# Patient Record
Sex: FEMALE | Race: WHITE
[De-identification: names, ages, dates, MRNs, and addresses within clinical notes are randomized per-mention and may not be internally consistent; named-entity substitution may affect disease eponyms.]

---

## 2018-05-10 ENCOUNTER — HOSPITAL ENCOUNTER (EMERGENCY)
Dept: HOSPITAL 62 - ER | Age: 53
Discharge: HOME | End: 2018-05-10
Payer: SELF-PAY

## 2018-05-10 VITALS — DIASTOLIC BLOOD PRESSURE: 74 MMHG | SYSTOLIC BLOOD PRESSURE: 128 MMHG

## 2018-05-10 DIAGNOSIS — K21.9: ICD-10-CM

## 2018-05-10 DIAGNOSIS — E66.9: ICD-10-CM

## 2018-05-10 DIAGNOSIS — R07.9: ICD-10-CM
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
## 2018-05-10 DIAGNOSIS — S43.402A: Primary | ICD-10-CM

## 2018-05-10 DIAGNOSIS — R10.13: ICD-10-CM

## 2018-05-10 LAB
ADD MANUAL DIFF: NO
ALBUMIN SERPL-MCNC: 4.2 G/DL (ref 3.5–5)
ALP SERPL-CCNC: 83 U/L (ref 38–126)
ALT SERPL-CCNC: 19 U/L (ref 9–52)
ANION GAP SERPL CALC-SCNC: 14 MMOL/L (ref 5–19)
AST SERPL-CCNC: 24 U/L (ref 14–36)
BASOPHILS # BLD AUTO: 0.1 10^3/UL (ref 0–0.2)
BASOPHILS NFR BLD AUTO: 0.8 % (ref 0–2)
BILIRUB DIRECT SERPL-MCNC: 0.2 MG/DL (ref 0–0.4)
BILIRUB SERPL-MCNC: 0.2 MG/DL (ref 0.2–1.3)
BUN SERPL-MCNC: 14 MG/DL (ref 7–20)
CALCIUM: 9.3 MG/DL (ref 8.4–10.2)
CHLORIDE SERPL-SCNC: 106 MMOL/L (ref 98–107)
CO2 SERPL-SCNC: 24 MMOL/L (ref 22–30)
EOSINOPHIL # BLD AUTO: 0.1 10^3/UL (ref 0–0.6)
EOSINOPHIL NFR BLD AUTO: 0.8 % (ref 0–6)
ERYTHROCYTE [DISTWIDTH] IN BLOOD BY AUTOMATED COUNT: 14 % (ref 11.5–14)
GLUCOSE SERPL-MCNC: 116 MG/DL (ref 75–110)
HCT VFR BLD CALC: 37.4 % (ref 36–47)
HGB BLD-MCNC: 12.3 G/DL (ref 12–15.5)
LIPASE SERPL-CCNC: 181.7 U/L (ref 23–300)
LYMPHOCYTES # BLD AUTO: 1.7 10^3/UL (ref 0.5–4.7)
LYMPHOCYTES NFR BLD AUTO: 17.4 % (ref 13–45)
MCH RBC QN AUTO: 27 PG (ref 27–33.4)
MCHC RBC AUTO-ENTMCNC: 33 G/DL (ref 32–36)
MCV RBC AUTO: 82 FL (ref 80–97)
MONOCYTES # BLD AUTO: 0.7 10^3/UL (ref 0.1–1.4)
MONOCYTES NFR BLD AUTO: 6.9 % (ref 3–13)
NEUTROPHILS # BLD AUTO: 7.2 10^3/UL (ref 1.7–8.2)
NEUTS SEG NFR BLD AUTO: 74.1 % (ref 42–78)
PLATELET # BLD: 301 10^3/UL (ref 150–450)
POTASSIUM SERPL-SCNC: 4.2 MMOL/L (ref 3.6–5)
PROT SERPL-MCNC: 7.5 G/DL (ref 6.3–8.2)
RBC # BLD AUTO: 4.57 10^6/UL (ref 3.72–5.28)
SODIUM SERPL-SCNC: 143.6 MMOL/L (ref 137–145)
TOTAL CELLS COUNTED % (AUTO): 100 %
WBC # BLD AUTO: 9.7 10^3/UL (ref 4–10.5)

## 2018-05-10 PROCEDURE — 93010 ELECTROCARDIOGRAM REPORT: CPT

## 2018-05-10 PROCEDURE — 93005 ELECTROCARDIOGRAM TRACING: CPT

## 2018-05-10 PROCEDURE — 80053 COMPREHEN METABOLIC PANEL: CPT

## 2018-05-10 PROCEDURE — 96375 TX/PRO/DX INJ NEW DRUG ADDON: CPT

## 2018-05-10 PROCEDURE — 36415 COLL VENOUS BLD VENIPUNCTURE: CPT

## 2018-05-10 PROCEDURE — 83690 ASSAY OF LIPASE: CPT

## 2018-05-10 PROCEDURE — 96374 THER/PROPH/DIAG INJ IV PUSH: CPT

## 2018-05-10 PROCEDURE — 84484 ASSAY OF TROPONIN QUANT: CPT

## 2018-05-10 PROCEDURE — 99284 EMERGENCY DEPT VISIT MOD MDM: CPT

## 2018-05-10 PROCEDURE — 71045 X-RAY EXAM CHEST 1 VIEW: CPT

## 2018-05-10 PROCEDURE — 85025 COMPLETE CBC W/AUTO DIFF WBC: CPT

## 2018-05-10 PROCEDURE — 73030 X-RAY EXAM OF SHOULDER: CPT

## 2018-05-10 NOTE — EKG REPORT
SEVERITY:- BORDERLINE ECG -

SINUS RHYTHM

BORDERLINE LEFT AXIS DEVIATION

LOW VOLTAGE THROUGHOUT

BORDERLINE T ABNORMALITIES, ANTERIOR LEADS

:

Confirmed by: Thor Carcamo MD 10-May-2018 19:23:20

## 2018-05-10 NOTE — RADIOLOGY REPORT (SQ)
EXAM DESCRIPTION:  CHEST SINGLE VIEW



COMPLETED DATE/TIME:  5/10/2018 3:02 pm



REASON FOR STUDY:  cp



COMPARISON:  None.



EXAM PARAMETERS:  NUMBER OF VIEWS: One view.

TECHNIQUE: Single frontal radiographic view of the chest acquired.

RADIATION DOSE: NA

LIMITATIONS: None.



FINDINGS:  LUNGS AND PLEURA: No opacities, masses or pneumothorax. No pleural effusion.

MEDIASTINUM AND HILAR STRUCTURES: No masses.  Contour normal.

HEART AND VASCULAR STRUCTURES: Heart normal in size.  Normal vasculature.

BONES: No acute findings.

HARDWARE: None in the chest.

OTHER: No other significant finding.



IMPRESSION:  NO ACUTE RADIOGRAPHIC FINDING IN THE CHEST.



TECHNICAL DOCUMENTATION:  JOB ID:  [id number]

 2011 [employer]- All Rights Reserved



Reading location - IP/workstation name: [email or web address]

## 2018-05-10 NOTE — ER DOCUMENT REPORT
ED General





- General


Chief Complaint: Shoulder Pain


Stated Complaint: SHOULDER INJURY


Time Seen by Provider: 05/10/18 14:10


Mode of Arrival: Ambulatory


Information source: Patient


Notes: 





Patient presents complaining of left shoulder pain.  Patient states she has a 

history of degenerative changes involving the shoulder and she has had pain for 

several months.  Patient states today she accidentally jammed her shoulder 

against a door frame which worsened her shoulder pain.  Patient additionally 

reports epigastric pain that feels as though it radiates up into her midsternal 

chest area off and on for the past month.  Patient suspects that she may have 

GERD symptoms.  Patient states that her symptoms are worse when she is lying 

supine.  Patient denies any difficulty breathing.


TRAVEL OUTSIDE OF THE [place] IN LAST 30 DAYS: No





- HPI


Onset: Just prior to arrival


Onset/Duration: Worse


Quality of pain: Achy


Pain Level: 5 - Left shoulder


Associated symptoms: Chest pain.  denies: Nonproductive cough, Productive cough

, Fever, Headache, Nausea, Shortness of breath


Exacerbated by: Supine


Relieved by: Sitting, Antacids


Similar symptoms previously: Yes


Recently seen / treated by doctor: No





Past Medical History





- General


Information source: Patient





- Social History


Smoking Status: Never Smoker


Frequency of alcohol use: None


Drug Abuse: None


Occupation: none


Lives with: Family


Family History: Reviewed & Not Pertinent





- Past Medical History


Cardiac Medical History: Reports: Hx Hypertension


Renal/ Medical History: Reports: Other - Neurogenic bladder


GI Medical History: Reports: Hx Gastroesophageal Reflux Disease


Psychiatric Medical History: Reports: Hx Depression


Past Surgical History: Reports: Hx Cholecystectomy, Other - Tumor removed from 

spinal cord





Review of Systems





- Review of Systems


Constitutional: No symptoms reported.  denies: Fever, Recent illness


EENT: No symptoms reported


Cardiovascular: Chest pain


Respiratory: No symptoms reported.  denies: Cough, Short of breath


Gastrointestinal: Abdominal pain.  denies: Diarrhea, Nausea, Vomiting


Genitourinary: No symptoms reported


Female Genitourinary: No symptoms reported


Musculoskeletal: Joint pain - Left shoulder.  denies: Back pain, Muscle pain


Skin: No symptoms reported


Hematologic/Lymphatic: No symptoms reported


Neurological/Psychological: No symptoms reported





Physical Exam





- Vital signs


Vitals: 


 











Temp Pulse Resp BP Pulse Ox


 


 97.5 F   77   18   149/96 H  96 


 


 05/10/18 13:54  05/10/18 13:54  05/10/18 13:54  05/10/18 13:54  05/10/18 13:54














- General


General appearance: Appears well, Alert


In distress: None





- HEENT


Head: Normocephalic


Eyes: Normal


Conjunctiva: Normal


Nasal: Normal


Mouth/Lips: Normal


Mucous membranes: Normal


Neck: Normal, Supple.  No: Lymphadenopathy





- Respiratory


Respiratory status: No respiratory distress


Chest status: Tender


Breath sounds: Normal


Chest palpation: Tender





- Cardiovascular


Rhythm: Regular


Heart sounds: S1 appreciated, S2 appreciated





- Abdominal


Inspection: Obese


Bowel sounds: Normal


Tenderness: Tender - epigastric


Organomegaly: No organomegaly





- Back


Back: Normal, Nontender





- Extremities


General upper extremity: Tender - left shoulder, Normal strength


General lower extremity: Normal inspection, Normal strength


Shoulder: Tender - left, Other - Patient with tenderness over anterior and 

posterior aspect of humeral head, tenderness increases with abduction and 

extension.  No: Abrasion, Deformity, Dislocation, Limited ROM





- Neurological


Neuro grossly intact: Yes


Cognition: Normal


[place] Coma Scale Eye Opening: Spontaneous


[place] Coma Scale Verbal: Oriented


[place] Coma Scale Motor: Obeys Commands


[place]andon Coma Scale Total: 15





- Psychological


Associated symptoms: Normal affect, Normal mood





- Skin


Skin Temperature: Warm


Skin Moisture: Dry


Skin Color: Normal





Course





- Re-evaluation


Re-evalutation: 





05/10/18 15:30


Patient reports that epigastric abdominal pain chest pain symptoms improved 

after taking GI cocktail.  Patient complains of continued left shoulder joint 

pain also though states that is improved after the pain medication.





05/10/18 16:38


The patient has atypical chest pain as the patient's chest pain is not 

suggestive of pulmonary embolus, cardiac ischemia, aortic dissection, or other 

serious etiology.  Given the extremely low risk of these diagnoses for the test 

in evaluation for these possibilities does not appear to be indicated at this 

time.  Patient has been instructed to return if the symptoms worsen or change 

in any way.  Patient has a known history of GERD symptoms.  Chest pain, 

epigastric pain symptoms did improve after GI cocktail.  No concern for PE or 

DVT at this time.  Patient heart score of 2.


05/10/18 16:41


Consulted with Dr. Mata, reviewed patient's diagnostic evaluation agrees with 

discharge plan of care at this time.





- Vital Signs


Vital signs: 


 











Temp Pulse Resp BP Pulse Ox


 


 97.5 F   77   21 H  128/74 H  98 


 


 05/10/18 13:54  05/10/18 13:54  05/10/18 16:02  05/10/18 16:02  05/10/18 16:02














- Laboratory


Result Diagrams: 


 05/10/18 15:06





 05/10/18 15:06


Laboratory results interpreted by me: 


 











  05/10/18





  15:06


 


Glucose  116 H














 Labs- Entire Visit











  05/10/18 05/10/18 05/10/18





  15:06 15:06 15:06


 


WBC  9.7  


 


RBC  4.57  


 


Hgb  12.3  


 


Hct  37.4  


 


MCV  82  


 


MCH  27.0  


 


MCHC  33.0  


 


RDW  14.0  


 


Plt Count  301  


 


Seg Neutrophils %  74.1  


 


Lymphocytes %  17.4  


 


Monocytes %  6.9  


 


Eosinophils %  0.8  


 


Basophils %  0.8  


 


Absolute Neutrophils  7.2  


 


Absolute Lymphocytes  1.7  


 


Absolute Monocytes  0.7  


 


Absolute Eosinophils  0.1  


 


Absolute Basophils  0.1  


 


Sodium   143.6 


 


Potassium   4.2 


 


Chloride   106 


 


Carbon Dioxide   24 


 


Anion Gap   14 


 


BUN   14 


 


Creatinine   0.80 


 


Est GFR ( Amer)   > 60 


 


Est GFR (Non-Af Amer)   > 60 


 


Glucose   116 H 


 


Calcium   9.3 


 


Total Bilirubin   0.2 


 


Direct Bilirubin   0.2 


 


Neonat Total Bilirubin   Not Reportable 


 


Neonat Direct Bilirubin   Not Reportable 


 


Neonat Indirect Bili   Not Reportable 


 


AST   24 


 


ALT   19 


 


Alkaline Phosphatase   83 


 


Troponin I    < 0.012


 


Total Protein   7.5 


 


Albumin   4.2 


 


Lipase   181.7 














- Diagnostic Test


Radiology reviewed: Reports reviewed





Discharge





- Discharge


Clinical Impression: 


Chest pain


Qualifiers:


 Chest pain type: unspecified Qualified Code(s): R07.9 - Chest pain, unspecified





GERD (gastroesophageal reflux disease)


Qualifiers:


 Esophagitis presence: esophagitis presence not specified Qualified Code(s): 

K21.9 - Gastro-esophageal reflux disease without esophagitis





Sprain of left shoulder


Qualifiers:


 Encounter type: initial encounter Shoulder sprain type: unspecified sprain 

Qualified Code(s): S43.402A - Unspecified sprain of left shoulder joint, 

initial encounter





Condition: Stable


Disposition: HOME, SELF-CARE


Instructions:  Sprain ([facility]), Temporary Sling ([facility]), Oral Narcotic Medication (

[facility]), Reflux Disease (GERD) ([facility])


Additional Instructions: 


Return immediately for any new or worsening symptoms





Followup with your primary care provider, call tomorrow to make a followup 

appointment





Follow-up with a orthopedic doctor for further evaluation of left shoulder 

joint pain





Wear sling for the next 4 days and then remove.


Prescriptions: 


Hydrocodone/Acetaminophen [Norco 5-325 Tablet] 1 each PO Q4 PRN #15 tablet


 PRN Reason: 


Omeprazole Magnesium [Prilosec Otc] 20 mg PO DAILY #15 tablet.


Sucralfate [Carafate 1 gm Tablet] 1 gm PO ACHS #40 tablet


Referrals: 


[facility] [Provider Group] - Follow up as needed


[facility] [Provider Group] - Follow up as needed

## 2018-05-10 NOTE — ER DOCUMENT REPORT
ED Medical Screen (RME)





- General


Chief Complaint: Shoulder Pain


Stated Complaint: SHOULDER INJURY


Time Seen by Provider: 05/10/18 14:10


Mode of Arrival: Ambulatory


Information source: Patient


TRAVEL OUTSIDE OF THE [place] IN LAST 30 DAYS: No





- HPI


Patient complains to provider of: shoulder pain


Notes: 





05/10/18 14:46


Patient is here with complaints of left shoulder pain.  She states that she has 

had left shoulder pain for the last few months.  She was seen in [facility] and told 

that she had degenerative changes.  She just moved to this area.  Today she hit 

her left shoulder and has increased pain.  He is now also complaining of some 

chest pain and shortness of breath.





Physical exam: No  distress, nontoxic appearing.





An initial examination was made on the patient as part of the triage process, 

and it was determined a more comprehensive evaluation was necessary. Initial 

labs were ordered and patient was transferred to another provider in the ED who 

assumed care and finished evaluation and plan.








Physical Exam





- Vital signs


Vitals: 





 











Temp Pulse Resp BP Pulse Ox


 


 97.5 F   77   18   149/96 H  96 


 


 05/10/18 13:54  05/10/18 13:54  05/10/18 13:54  05/10/18 13:54  05/10/18 13:54














Course





- Vital Signs


Vital signs: 





 











Temp Pulse Resp BP Pulse Ox


 


 97.5 F   77   18   149/96 H  96 


 


 05/10/18 13:54  05/10/18 13:54  05/10/18 13:54  05/10/18 13:54  05/10/18 13:54

## 2018-05-10 NOTE — RADIOLOGY REPORT (SQ)
EXAM DESCRIPTION:  SHOULDER LEFT 2 OR MORE VIEWS



COMPLETED DATE/TIME:  5/10/2018 2:14 pm



REASON FOR STUDY:  pain



COMPARISON:  None.



NUMBER OF VIEWS:  Three views.



TECHNIQUE:  Internal rotation, external rotation, and Y view images acquired of the left shoulder.



LIMITATIONS:  None.



FINDINGS:  MINERALIZATION: Normal.

BONES: No acute fracture or dislocation.  No worrisome bone lesions.

JOINTS: No dislocation.

VISUALIZED LUNGS AND RIBS: No pneumothorax.  No rib fracture.

SOFT TISSUES: No radiopaque foreign body.

OTHER: No other significant finding.



IMPRESSION:  NEGATIVE STUDY OF THE LEFT SHOULDER. NO RADIOGRAPHIC EVIDENCE OF ACUTE INJURY.



TECHNICAL DOCUMENTATION:  JOB ID:  [id number]

 2011 [employer]- All Rights Reserved



Reading location - IP/workstation name: [email or web address]